# Patient Record
Sex: MALE | Race: WHITE | Employment: FULL TIME | URBAN - METROPOLITAN AREA
[De-identification: names, ages, dates, MRNs, and addresses within clinical notes are randomized per-mention and may not be internally consistent; named-entity substitution may affect disease eponyms.]

---

## 2017-02-17 ENCOUNTER — OFFICE VISIT (OUTPATIENT)
Dept: FAMILY MEDICINE CLINIC | Age: 52
End: 2017-02-17

## 2017-02-17 VITALS
WEIGHT: 234.2 LBS | HEART RATE: 70 BPM | DIASTOLIC BLOOD PRESSURE: 90 MMHG | BODY MASS INDEX: 32.21 KG/M2 | OXYGEN SATURATION: 97 % | SYSTOLIC BLOOD PRESSURE: 138 MMHG

## 2017-02-17 DIAGNOSIS — G89.29 CHRONIC RIGHT-SIDED LOW BACK PAIN WITHOUT SCIATICA: ICD-10-CM

## 2017-02-17 DIAGNOSIS — M54.50 CHRONIC RIGHT-SIDED LOW BACK PAIN WITHOUT SCIATICA: ICD-10-CM

## 2017-02-17 DIAGNOSIS — N50.811 TESTICULAR PAIN, RIGHT: Primary | ICD-10-CM

## 2017-02-17 LAB
BILIRUBIN, POC: 0
BLOOD URINE, POC: 0
CLARITY, POC: CLEAR
COLOR, POC: YELLOW
GLUCOSE URINE, POC: 0
KETONES, POC: 0
LEUKOCYTE EST, POC: 0
NITRITE, POC: 0
PH, POC: 5
PROSTATE SPECIFIC ANTIGEN: 1.85 NG/ML (ref 0–4)
PROTEIN, POC: 0
SPECIFIC GRAVITY, POC: 1.02
UROBILINOGEN, POC: 0.2

## 2017-02-17 PROCEDURE — 81002 URINALYSIS NONAUTO W/O SCOPE: CPT | Performed by: PHYSICIAN ASSISTANT

## 2017-02-17 PROCEDURE — 99213 OFFICE O/P EST LOW 20 MIN: CPT | Performed by: PHYSICIAN ASSISTANT

## 2017-02-17 PROCEDURE — 36415 COLL VENOUS BLD VENIPUNCTURE: CPT | Performed by: PHYSICIAN ASSISTANT

## 2017-02-17 RX ORDER — NABUMETONE 750 MG/1
750 TABLET, FILM COATED ORAL 2 TIMES DAILY
Qty: 60 TABLET | Refills: 0 | Status: SHIPPED | OUTPATIENT
Start: 2017-02-17 | End: 2020-01-07

## 2017-02-17 ASSESSMENT — ENCOUNTER SYMPTOMS: RESPIRATORY NEGATIVE: 1

## 2017-02-24 ENCOUNTER — TELEPHONE (OUTPATIENT)
Dept: FAMILY MEDICINE CLINIC | Age: 52
End: 2017-02-24

## 2018-11-15 ENCOUNTER — OFFICE VISIT (OUTPATIENT)
Dept: FAMILY MEDICINE CLINIC | Age: 53
End: 2018-11-15
Payer: COMMERCIAL

## 2018-11-15 VITALS
DIASTOLIC BLOOD PRESSURE: 88 MMHG | WEIGHT: 246.2 LBS | HEART RATE: 70 BPM | HEIGHT: 71 IN | TEMPERATURE: 97.9 F | BODY MASS INDEX: 34.47 KG/M2 | OXYGEN SATURATION: 97 % | SYSTOLIC BLOOD PRESSURE: 138 MMHG

## 2018-11-15 DIAGNOSIS — R21 RASH OF GROIN: ICD-10-CM

## 2018-11-15 DIAGNOSIS — B35.9 TINEA: Primary | ICD-10-CM

## 2018-11-15 DIAGNOSIS — L28.2 PRURITIC RASH: ICD-10-CM

## 2018-11-15 PROCEDURE — 99213 OFFICE O/P EST LOW 20 MIN: CPT | Performed by: PHYSICIAN ASSISTANT

## 2018-11-15 RX ORDER — CLOTRIMAZOLE 1 %
CREAM (GRAM) TOPICAL
Qty: 60 G | Refills: 1 | Status: SHIPPED | OUTPATIENT
Start: 2018-11-15 | End: 2020-01-07

## 2018-11-15 RX ORDER — MELOXICAM 15 MG/1
1 TABLET ORAL
Refills: 4 | COMMUNITY
Start: 2018-10-25 | End: 2020-01-07

## 2018-11-15 ASSESSMENT — PATIENT HEALTH QUESTIONNAIRE - PHQ9
2. FEELING DOWN, DEPRESSED OR HOPELESS: 0
SUM OF ALL RESPONSES TO PHQ QUESTIONS 1-9: 0
SUM OF ALL RESPONSES TO PHQ QUESTIONS 1-9: 0
1. LITTLE INTEREST OR PLEASURE IN DOING THINGS: 0
SUM OF ALL RESPONSES TO PHQ9 QUESTIONS 1 & 2: 0

## 2018-11-15 NOTE — PROGRESS NOTES
Desonex, antifungal, right inner thigh. . Years. Resolved and returned. Itches only to right inner thigh. X 3 mo. [address]      Chief Complaint   Patient presents with    Rash     Rash on leg for 2-3 months         Subjective: Meredith Zambrano is a 48 y.o.  male           Review of Systems: Remaining reviewed and are otherwise unremarkable for other neurologic, constitutional, ENT, cardiac, pulmonary, GI, , or  musculoskeletal complaints. Medications/ Allergies: Reviewed and in chart. Objective:   Physical Exam   Constitutional: He appears well-developed and well-nourished. Skin: Skin is warm and dry. Rash noted. Well circumscribed scaly erythemic warm excoriated rash to groin . Vitals reviewed. Assessment and Plan      1. Tinea    2. Pruritic rash    3. Rash of groin          Patient Instructions   Patient Education- jock itch. Orders Placed This Encounter   Medications    clotrimazole (LOTRIMIN) 1 % cream     Sig: Apply topically 2 times daily for one week AFTER the rash has disappeared.      Dispense:  60 g     Refill:  1

## 2020-01-07 ENCOUNTER — OFFICE VISIT (OUTPATIENT)
Dept: FAMILY MEDICINE CLINIC | Age: 55
End: 2020-01-07
Payer: COMMERCIAL

## 2020-01-07 VITALS
OXYGEN SATURATION: 97 % | HEIGHT: 71 IN | WEIGHT: 251 LBS | BODY MASS INDEX: 35.14 KG/M2 | HEART RATE: 92 BPM | SYSTOLIC BLOOD PRESSURE: 138 MMHG | DIASTOLIC BLOOD PRESSURE: 92 MMHG

## 2020-01-07 PROCEDURE — 99213 OFFICE O/P EST LOW 20 MIN: CPT | Performed by: PHYSICIAN ASSISTANT

## 2020-01-07 RX ORDER — LEVOFLOXACIN 750 MG/1
750 TABLET ORAL DAILY
COMMUNITY
End: 2020-03-02

## 2020-01-07 RX ORDER — LISINOPRIL 5 MG/1
5 TABLET ORAL DAILY
Qty: 30 TABLET | Refills: 1 | Status: SHIPPED | OUTPATIENT
Start: 2020-01-07 | End: 2020-01-10 | Stop reason: SINTOL

## 2020-01-07 RX ORDER — MECLIZINE HYDROCHLORIDE 25 MG/1
25 TABLET ORAL 3 TIMES DAILY PRN
Qty: 30 TABLET | Refills: 0 | Status: SHIPPED | OUTPATIENT
Start: 2020-01-07 | End: 2020-01-17

## 2020-01-07 ASSESSMENT — PATIENT HEALTH QUESTIONNAIRE - PHQ9
SUM OF ALL RESPONSES TO PHQ QUESTIONS 1-9: 0
SUM OF ALL RESPONSES TO PHQ QUESTIONS 1-9: 0
1. LITTLE INTEREST OR PLEASURE IN DOING THINGS: 0
2. FEELING DOWN, DEPRESSED OR HOPELESS: 0
SUM OF ALL RESPONSES TO PHQ9 QUESTIONS 1 & 2: 0

## 2020-01-07 ASSESSMENT — ENCOUNTER SYMPTOMS: RESPIRATORY NEGATIVE: 1

## 2020-01-07 NOTE — PROGRESS NOTES
Subjective:      Patient ID: Arely Aguirre is a 47 y.o. male. HPI  Patient had cold symptoms on 12/26, worsened and went to urgent care and was started on levaquin for sinus and ear infection, has 2 days left of that. While at urgent care BP was noted to be elevated. Has had left ear pressure and vertigo with nausea for the last couple of day. Review of Systems   Constitutional: Positive for appetite change. Negative for fever. HENT: Positive for ear pain. Left ear pressure   Respiratory: Negative. Cardiovascular: Negative. Neurological: Positive for dizziness. Objective:   Physical Exam  Constitutional:       Appearance: Normal appearance. HENT:      Right Ear: Tympanic membrane normal.      Left Ear: Tympanic membrane normal.      Nose: Nose normal.      Mouth/Throat:      Mouth: Mucous membranes are moist.   Cardiovascular:      Rate and Rhythm: Normal rate and regular rhythm. Pulses: Normal pulses. Heart sounds: Normal heart sounds. Neurological:      Mental Status: He is alert. Assessment:       Diagnosis Orders   1. Essential hypertension     2. Vertigo             Plan: Will start lisinopril with follow up in 3-4 weeks for recheck. Discussed diet, exercise, weight loss and sodium restriction. Meclizine and flonase for vertigo. Patient is to call if no improvement or signs and symptoms worsen.           YINKA Blandon

## 2020-01-10 ENCOUNTER — TELEPHONE (OUTPATIENT)
Dept: FAMILY MEDICINE CLINIC | Age: 55
End: 2020-01-10

## 2020-01-10 RX ORDER — AMLODIPINE BESYLATE 5 MG/1
5 TABLET ORAL DAILY
Qty: 30 TABLET | Refills: 0 | Status: SHIPPED | OUTPATIENT
Start: 2020-01-10 | End: 2020-01-28 | Stop reason: SDUPTHER

## 2020-01-17 NOTE — TELEPHONE ENCOUNTER
Pt doesn't have any sob, fevers, chest tightness or chest congestion. He states he has drainage that is making him cough. I offered an appt. He declined. He will try otc allergy medication.

## 2020-01-28 ENCOUNTER — OFFICE VISIT (OUTPATIENT)
Dept: FAMILY MEDICINE CLINIC | Age: 55
End: 2020-01-28
Payer: COMMERCIAL

## 2020-01-28 VITALS
SYSTOLIC BLOOD PRESSURE: 140 MMHG | WEIGHT: 253.6 LBS | OXYGEN SATURATION: 98 % | HEIGHT: 71 IN | HEART RATE: 75 BPM | BODY MASS INDEX: 35.5 KG/M2 | DIASTOLIC BLOOD PRESSURE: 92 MMHG

## 2020-01-28 PROCEDURE — 99213 OFFICE O/P EST LOW 20 MIN: CPT | Performed by: PHYSICIAN ASSISTANT

## 2020-01-28 RX ORDER — BENZONATATE 100 MG/1
100 CAPSULE ORAL 3 TIMES DAILY PRN
Qty: 21 CAPSULE | Refills: 0 | Status: SHIPPED | OUTPATIENT
Start: 2020-01-28 | End: 2020-02-04

## 2020-01-28 RX ORDER — AMLODIPINE BESYLATE 5 MG/1
5 TABLET ORAL DAILY
Qty: 30 TABLET | Refills: 1 | Status: SHIPPED | OUTPATIENT
Start: 2020-01-28 | End: 2020-03-02 | Stop reason: ALTCHOICE

## 2020-03-02 ENCOUNTER — OFFICE VISIT (OUTPATIENT)
Dept: FAMILY MEDICINE CLINIC | Age: 55
End: 2020-03-02
Payer: COMMERCIAL

## 2020-03-02 VITALS
DIASTOLIC BLOOD PRESSURE: 84 MMHG | HEART RATE: 78 BPM | SYSTOLIC BLOOD PRESSURE: 142 MMHG | OXYGEN SATURATION: 99 % | BODY MASS INDEX: 35.7 KG/M2 | HEIGHT: 71 IN | WEIGHT: 255 LBS

## 2020-03-02 PROCEDURE — 99213 OFFICE O/P EST LOW 20 MIN: CPT | Performed by: PHYSICIAN ASSISTANT

## 2020-03-02 RX ORDER — AMLODIPINE BESYLATE 10 MG/1
10 TABLET ORAL DAILY
Qty: 30 TABLET | Refills: 0 | Status: SHIPPED | OUTPATIENT
Start: 2020-03-02 | End: 2020-03-03 | Stop reason: SDUPTHER

## 2020-03-03 RX ORDER — AMLODIPINE BESYLATE 10 MG/1
10 TABLET ORAL DAILY
Qty: 30 TABLET | Refills: 0 | Status: SHIPPED | OUTPATIENT
Start: 2020-03-03 | End: 2020-03-27 | Stop reason: SDUPTHER

## 2020-03-03 NOTE — TELEPHONE ENCOUNTER
Pt called and states that the [facility], he needs to it sent to [facility] in [place].     [facility] taken out of pt's chart per his request

## 2020-03-27 RX ORDER — AMLODIPINE BESYLATE 10 MG/1
10 TABLET ORAL DAILY
Qty: 30 TABLET | Refills: 1 | Status: SHIPPED | OUTPATIENT
Start: 2020-03-27 | End: 2020-06-02 | Stop reason: SDUPTHER

## 2020-03-27 NOTE — TELEPHONE ENCOUNTER
Rodríguez Sylvester [phone] (home)    is requesting refill(s) of medication Amlodipine  to preferred pharmacy [facility] 3/2/20 (pertaining to medication)   Last refill 3/3/20 (per medication requested)  Next office visit scheduled or attempted Yes  Date 5/5/20  If No, reason

## 2020-06-02 RX ORDER — AMLODIPINE BESYLATE 10 MG/1
10 TABLET ORAL DAILY
Qty: 30 TABLET | Refills: 1 | Status: SHIPPED | OUTPATIENT
Start: 2020-06-02 | End: 2020-08-03

## 2020-06-02 NOTE — TELEPHONE ENCOUNTER
Iris Morelia [phone] (home)    is requesting refill(s) of medication amLODIPine (NORVASC) 10 MG tablet      to preferred pharmacy [facility] [address] [facility]    Last OV 3/02/20 (pertaining to medication)   Last refill 3/27/20 (per medication requested)  Next office visit scheduled or attempted Yes  Date 6/8/20  If No, reason

## 2020-06-08 ENCOUNTER — OFFICE VISIT (OUTPATIENT)
Dept: FAMILY MEDICINE CLINIC | Age: 55
End: 2020-06-08
Payer: COMMERCIAL

## 2020-06-08 VITALS
BODY MASS INDEX: 36.48 KG/M2 | DIASTOLIC BLOOD PRESSURE: 80 MMHG | WEIGHT: 260.6 LBS | TEMPERATURE: 98.9 F | HEIGHT: 71 IN | OXYGEN SATURATION: 98 % | SYSTOLIC BLOOD PRESSURE: 140 MMHG | HEART RATE: 89 BPM

## 2020-06-08 PROCEDURE — 99213 OFFICE O/P EST LOW 20 MIN: CPT | Performed by: PHYSICIAN ASSISTANT

## 2020-06-08 NOTE — PROGRESS NOTES
Subjective: Jennifer Hutchins is a 54 y.o. male with hypertension. Current Outpatient Medications   Medication Sig Dispense Refill    amLODIPine (NORVASC) 10 MG tablet Take 1 tablet by mouth daily 30 tablet 1     No current facility-administered medications for this visit. Hypertension ROS: taking medications as instructed, no medication side effects noted, no TIA's, no chest pain on exertion, notes new dyspnea on exertion, no swelling of ankles. New concerns: feeling more SOB than normal, easily winded. Face feels flushed often, reflux symptoms or burning in the throat and eyes are irritated. Objective:   BP (!) 140/80 (Site: Left Upper Arm, Position: Sitting)   Pulse 89   Temp 98.9 °F (37.2 °C)   Ht 5' 11\" (1.803 m)   Wt 260 lb 9.6 oz (118.2 kg)   SpO2 98%   BMI 36.35 kg/m²    Appearance alert, well appearing, and in no distress, oriented to person, place, and time and overweight. General exam BP noted to be well controlled today in office, S1, S2 normal, no gallop, no murmur, chest clear, no JVD, no HSM, no edema. Assessment:     Diagnosis Orders   1. Essential hypertension  Kathy Antonio MD, Cardiology, [facility]   2. BURGOS (dyspnea on exertion)  Kathy Antonio MD, Cardiology, [facility]         Plan: Will refer to cardiology for evaluation. He is to schedule with eye doctor. Trial of nexium for reflux like symptoms.

## 2020-06-18 NOTE — PROGRESS NOTES
disorientation or hallucinations  Ophthalmic ROS: negative for - dry eyes, eye pain or loss of vision  ENT ROS: negative for - nasal discharge or sore throat  Allergy and Immunology ROS: negative for - hives or itchy/watery eyes  Hematological and Lymphatic ROS: negative for - jaundice or night sweats  Endocrine ROS: negative for - mood swings or temperature intolerance  Breast ROS: deferred  Respiratory ROS: negative for - hemoptysis or stridor  Gastrointestinal ROS: no abdominal pain, change in bowel habits, or black or bloody stools  Genito-Urinary ROS: no dysuria, trouble voiding, or hematuria  Musculoskeletal ROS: negative for - gait disturbance, joint pain or joint stiffness  Neurological ROS: negative for - seizures or speech problems  Dermatological ROS: negative for - rash or skin lesion changes      Physical Examination:    There were no vitals taken for this visit. There were no vitals taken for this visit.          Wt Readings from Last 3 Encounters:   06/08/20 260 lb 9.6 oz (118.2 kg)   03/02/20 255 lb (115.7 kg)   01/28/20 253 lb 9.6 oz (115 kg)     No intake or output data in the 24 hours ending 06/18/20 1128    General Appearance:  Alert, cooperative, no distress, appears stated age   Head:  Normocephalic, without obvious abnormality, atraumatic   Eyes:  PERRL, conjunctiva/corneas clear       Nose: Nares normal, no drainage or sinus tenderness   Throat: Lips, mucosa, and tongue normal   Neck: Supple, symmetrical, trachea midline, no adenopathy, thyroid: not enlarged, symmetric, no tenderness/mass/nodules, no carotid bruit or JVD       Lungs:   Clear to auscultation bilaterally, respirations unlabored   Chest Wall:  No tenderness or deformity   Heart:  Regular rhythm and normal rate; S1, S2 are normal; no murmur noted; no rub or gallop   Abdomen:   Soft, non-tender, bowel sounds active all four quadrants,  no masses, no organomegaly           Extremities: Extremities normal, atraumatic, no cyanosis or

## 2020-06-19 ENCOUNTER — OFFICE VISIT (OUTPATIENT)
Dept: CARDIOLOGY CLINIC | Age: 55
End: 2020-06-19
Payer: COMMERCIAL

## 2020-06-19 VITALS
OXYGEN SATURATION: 97 % | WEIGHT: 263 LBS | HEIGHT: 71 IN | DIASTOLIC BLOOD PRESSURE: 94 MMHG | BODY MASS INDEX: 36.82 KG/M2 | SYSTOLIC BLOOD PRESSURE: 144 MMHG | HEART RATE: 87 BPM

## 2020-06-19 PROBLEM — I10 ESSENTIAL HYPERTENSION: Status: ACTIVE | Noted: 2020-06-19

## 2020-06-19 PROBLEM — R06.02 SOB (SHORTNESS OF BREATH): Status: ACTIVE | Noted: 2020-06-19

## 2020-06-19 PROBLEM — R07.89 OTHER CHEST PAIN: Status: ACTIVE | Noted: 2020-06-19

## 2020-06-19 PROCEDURE — 99244 OFF/OP CNSLTJ NEW/EST MOD 40: CPT | Performed by: INTERNAL MEDICINE

## 2020-06-19 PROCEDURE — 93000 ELECTROCARDIOGRAM COMPLETE: CPT | Performed by: INTERNAL MEDICINE

## 2020-06-19 NOTE — PROGRESS NOTES
[address] [facility]   Cardiovascular Evaluation    PATIENT: Sandoval Real  DATE: 2020  MRN: <[id number]>  CSN: [id number]  : 1965    Primary Care Doctor/Referring provider: YINKA Pena    Reason for evaluation/Chief complaint:   New Patient; Chest Pain; Edema; and Shortness of Breath      Subjective:    History of present illness on initial date of evaluation:   Sandoval Real is a 54 y.o. patient who presents as a new patient for evaluation of chest pain and shortness of breath. He has a past medical history of hypertension. Today he reports he has been having chest pain off and on over the past year. He states sometimes the pain is sharp and other times it is dull. He reports over the past 3 months he has experienced shortness of breath with heavy exertion or walking any distance. He reports recent hand edema. He is not aware of any heart disease within his family. Patient Active Problem List   Diagnosis    Family history of colon cancer    Internal derangement of right knee    Patellar dislocation    Chondromalacia of right knee    Other chest pain    SOB (shortness of breath)    Essential hypertension         Cardiac Testing: I have reviewed the findings below. EKG:  ECHO:   STRESS TEST:  CATH:  BYPASS:  VASCULAR:    Past Medical History:   has a past medical history of Family history of colon cancer. Surgical History:   has a past surgical history that includes [place] tooth extraction. Social History:   reports that he has never smoked. He has never used smokeless tobacco. He reports that he does not drink alcohol or use drugs. Family History:  No evidence for sudden cardiac death or premature CAD    Medications:  Reviewed and are listed in nursing record. and/or listed below  Outpatient Medications:  Prior to Admission medications    Medication Sig Start Date End Date Taking?  Authorizing Provider   amLODIPine (NORVASC) 10 MG tablet Take 1 Echocardiogram to view size and strength of the heart   4. Stress Myoview test to risk stratify   5. CT calcium score to risk stratify  6. We will call you with the results   7. Follow up will be after testing to access further treatment     All questions and concerns were addressed to the patient/family. Alternatives to my treatment were discussed. This note was scribed in the presence of Dr.Puri SORIANO by Wilder Rosado RN.     I, Dr. Mariah Gamez, personally performed the services described in this documentation, as scribed by the above signed scribe in my presence. It is both accurate and complete to my knowledge. I agree with the details independently gathered by the clinical support staff, while the remaining scribed note accurately describes my personal service to the patient.         Mariah Gamez MD, [address], [facility], [address] [facility] office  [phone] [facility]  6/19/2020  9:02 AM

## 2020-06-30 ENCOUNTER — HOSPITAL ENCOUNTER (OUTPATIENT)
Dept: NUCLEAR MEDICINE | Age: 55
Discharge: HOME | End: 2020-06-30
Payer: COMMERCIAL

## 2020-06-30 ENCOUNTER — APPOINTMENT (OUTPATIENT)
Dept: NON INVASIVE DIAGNOSTICS | Age: 55
End: 2020-06-30
Payer: COMMERCIAL

## 2020-06-30 ENCOUNTER — HOSPITAL ENCOUNTER (OUTPATIENT)
Dept: NON INVASIVE DIAGNOSTICS | Age: 55
Discharge: HOME | End: 2020-06-30
Payer: COMMERCIAL

## 2020-06-30 ENCOUNTER — HOSPITAL ENCOUNTER (OUTPATIENT)
Dept: CT IMAGING | Age: 55
Discharge: HOME | End: 2020-06-30
Payer: COMMERCIAL

## 2020-06-30 ENCOUNTER — TELEPHONE (OUTPATIENT)
Dept: CARDIOLOGY CLINIC | Age: 55
End: 2020-06-30

## 2020-06-30 LAB
LV EF: 43 %
LV EF: 54 %
LVEF MODALITY: NORMAL
LVEF MODALITY: NORMAL

## 2020-06-30 PROCEDURE — 93017 CV STRESS TEST TRACING ONLY: CPT

## 2020-06-30 PROCEDURE — 93306 TTE W/DOPPLER COMPLETE: CPT

## 2020-06-30 PROCEDURE — 75571 CT HRT W/O DYE W/CA TEST: CPT

## 2020-06-30 PROCEDURE — A9502 TC99M TETROFOSMIN: HCPCS | Performed by: INTERNAL MEDICINE

## 2020-06-30 PROCEDURE — 78452 HT MUSCLE IMAGE SPECT MULT: CPT

## 2020-06-30 PROCEDURE — 3430000000 HC RX DIAGNOSTIC RADIOPHARMACEUTICAL: Performed by: INTERNAL MEDICINE

## 2020-06-30 RX ORDER — ATORVASTATIN CALCIUM 10 MG/1
10 TABLET, FILM COATED ORAL DAILY
Qty: 90 TABLET | Refills: 3 | Status: SHIPPED | OUTPATIENT
Start: 2020-06-30 | End: 2021-06-24

## 2020-06-30 RX ORDER — ASPIRIN 81 MG/1
81 TABLET ORAL DAILY
Qty: 90 TABLET | Refills: 3 | Status: SHIPPED | OUTPATIENT
Start: 2020-06-30 | End: 2021-07-20

## 2020-06-30 RX ADMIN — TETROFOSMIN 32.2 MILLICURIE: 1.38 INJECTION, POWDER, LYOPHILIZED, FOR SOLUTION INTRAVENOUS at 09:08

## 2020-06-30 RX ADMIN — TETROFOSMIN 11.1 MILLICURIE: 1.38 INJECTION, POWDER, LYOPHILIZED, FOR SOLUTION INTRAVENOUS at 07:49

## 2020-07-01 ENCOUNTER — TELEPHONE (OUTPATIENT)
Dept: CARDIOLOGY CLINIC | Age: 55
End: 2020-07-01

## 2020-07-01 NOTE — TELEPHONE ENCOUNTER
----- Message from Jacquie Beard MD sent at 6/30/2020  3:59 PM EDT -----  The test shows that the patient's overall heart strength may be mildly reduced. This is different than the myocardial perfusion scan which demonstrates normal heart strength. I recommend that the patient undergo cardiac MRI for further confirmation.

## 2020-07-03 ENCOUNTER — HOSPITAL ENCOUNTER (OUTPATIENT)
Age: 55
Discharge: HOME | End: 2020-07-03
Payer: COMMERCIAL

## 2020-07-03 LAB
ALBUMIN SERPL-MCNC: 3.8 G/DL (ref 3.4–5)
ALP BLD-CCNC: 96 U/L (ref 40–129)
ALT SERPL-CCNC: 21 U/L (ref 10–40)
AST SERPL-CCNC: 14 U/L (ref 15–37)
BILIRUB SERPL-MCNC: 0.4 MG/DL (ref 0–1)
BILIRUBIN DIRECT: <0.2 MG/DL (ref 0–0.3)
BILIRUBIN, INDIRECT: ABNORMAL MG/DL (ref 0–1)
CHOLESTEROL, TOTAL: 156 MG/DL (ref 0–199)
HDLC SERPL-MCNC: 42 MG/DL (ref 40–60)
LDL CHOLESTEROL CALCULATED: 96 MG/DL
TOTAL PROTEIN: 6.6 G/DL (ref 6.4–8.2)
TRIGL SERPL-MCNC: 88 MG/DL (ref 0–150)
VLDLC SERPL CALC-MCNC: 18 MG/DL

## 2020-07-03 PROCEDURE — 80076 HEPATIC FUNCTION PANEL: CPT

## 2020-07-03 PROCEDURE — 36415 COLL VENOUS BLD VENIPUNCTURE: CPT

## 2020-07-03 PROCEDURE — 80061 LIPID PANEL: CPT

## 2020-07-08 ENCOUNTER — TELEPHONE (OUTPATIENT)
Dept: CARDIOLOGY CLINIC | Age: 55
End: 2020-07-08

## 2020-07-08 NOTE — TELEPHONE ENCOUNTER
Spoke with pt. refaxed MRI order to . Also gave pt the number to their central scheduling so he can call to set up.

## 2020-07-14 ENCOUNTER — TELEPHONE (OUTPATIENT)
Dept: CARDIOLOGY CLINIC | Age: 55
End: 2020-07-14

## 2020-07-14 NOTE — TELEPHONE ENCOUNTER
Becca Steel needs pt's clinical reports faxed to her at fax#:  [phone], they received pt's order for his MRI, but no authorization through his insurance.

## 2020-07-20 LAB
LV EF: 54 %
LVEF MODALITY: NORMAL

## 2020-07-22 ENCOUNTER — TELEPHONE (OUTPATIENT)
Dept: CARDIOLOGY CLINIC | Age: 55
End: 2020-07-22

## 2020-07-22 NOTE — TELEPHONE ENCOUNTER
----- Message from Yahaira Morel MD sent at 7/22/2020  1:27 PM EDT -----  The test is normal. Please call the patient and inform them of the normal result. Please inform the patient that there MRI, which is the gold standard, shows normal heart strength, size, and structural characteristics. The patient should continue aspirin 81 and Lipitor as previously recommended. They should follow-up with us in a few months and their primary care physician routinely for monitoring.

## 2020-07-22 NOTE — TELEPHONE ENCOUNTER
Created telephone encounter. Spoke with Bill relayed message per VSP regarding MRI. Pt verbalized understanding, he will call at a later date to schedule follow up.

## 2020-07-22 NOTE — TELEPHONE ENCOUNTER
----- Message from Darren Landau, MD sent at 7/22/2020  1:27 PM EDT -----  The test is normal. Please call the patient and inform them of the normal result. Please inform the patient that there MRI, which is the gold standard, shows normal heart strength, size, and structural characteristics. The patient should continue aspirin 81 and Lipitor as previously recommended. They should follow-up with us in a few months and their primary care physician routinely for monitoring.

## 2020-08-10 ENCOUNTER — TELEPHONE (OUTPATIENT)
Dept: FAMILY MEDICINE CLINIC | Age: 55
End: 2020-08-10

## 2020-08-10 ENCOUNTER — OFFICE VISIT (OUTPATIENT)
Dept: FAMILY MEDICINE CLINIC | Age: 55
End: 2020-08-10
Payer: COMMERCIAL

## 2020-08-10 VITALS
BODY MASS INDEX: 36.51 KG/M2 | OXYGEN SATURATION: 98 % | HEIGHT: 71 IN | SYSTOLIC BLOOD PRESSURE: 128 MMHG | DIASTOLIC BLOOD PRESSURE: 86 MMHG | WEIGHT: 260.8 LBS | HEART RATE: 85 BPM | TEMPERATURE: 98.4 F

## 2020-08-10 PROCEDURE — 99213 OFFICE O/P EST LOW 20 MIN: CPT | Performed by: PHYSICIAN ASSISTANT

## 2020-08-10 ASSESSMENT — ENCOUNTER SYMPTOMS
SHORTNESS OF BREATH: 1
COUGH: 1
CHEST TIGHTNESS: 1

## 2020-08-10 NOTE — TELEPHONE ENCOUNTER
I was not aware that they were only doing VV. They are ordering pulmonary function tests etc virtually so that is an option as that is what they will need to check into things. The other option would be to try another pulmonology group through [facility] or [facility].

## 2020-08-10 NOTE — PROGRESS NOTES
Subjective:      Patient ID: Mary Duke is a 54 y.o. male. HPI  Patient presents with continued SOB and cough. This has been an ongoing problem has seen cardiology and has had a full work up which has all been negative. The SOB is present most of the time even at rest but worse with exertion/humigity. He was a  for 30 years. Occasional cigar use but never a chronic smoker. The cough is dry. O2 sats at home mostly 94 range. When he is feeling very winded can get down to mid 80's. Other concern is a flushing sensation. Feels very warm at times like he has a temp but is always afebrile. He is on norvasc. Review of Systems   Constitutional: Negative. HENT: Negative. Respiratory: Positive for cough, chest tightness and shortness of breath. Cardiovascular: Positive for chest pain. Negative for leg swelling. Skin:        Flushing sensation       Objective:   Physical Exam  Constitutional:       Appearance: Normal appearance. He is obese. Cardiovascular:      Rate and Rhythm: Normal rate and regular rhythm. Pulses: Normal pulses. Heart sounds: Normal heart sounds. Pulmonary:      Effort: Pulmonary effort is normal.      Breath sounds: Normal breath sounds. Skin:     General: Skin is warm and dry. Neurological:      Mental Status: He is alert and oriented to person, place, and time. Assessment:       Diagnosis Orders   1. Chronic dyspnea  Felicita Parrish MD, Pulmonary, [facility]   2. Chronic cough  Elaina Mendoza MD, Pulmonary, [facility]           Plan: Will refer to pulmonology for eval.   Stop norvasc to see if flushing resolves. To call on Friday to update and will start new BP medication at that time.          YINKA Allen

## 2020-08-10 NOTE — TELEPHONE ENCOUNTER
Pt states he wants to go to [facility]. He doesn't want to do a vv or isn't able to do a vv. He doesn't feel comfortable with a vv for that.  He is wanting to know what [facility] pulmonary doctor you suggest

## 2020-08-17 ENCOUNTER — TELEPHONE (OUTPATIENT)
Dept: FAMILY MEDICINE CLINIC | Age: 55
End: 2020-08-17

## 2020-08-17 NOTE — TELEPHONE ENCOUNTER
Pt called and states that, per Ruchi's instruction, he stopped his bp medication on 8/10/20, because of the symptoms he was having. He states that the symptoms did not go away. (eyes burning, flushed skin). He states that he did restart his bp med, as his bp was high all weekend. He is wanting to know what Celine Olivier would advise.  Please advise pt [phone]

## 2020-08-17 NOTE — TELEPHONE ENCOUNTER
Im not really clear where the flushing sensation is coming from if not from the medication. None of the others should cause it.

## 2021-02-01 RX ORDER — AMLODIPINE BESYLATE 10 MG/1
TABLET ORAL
Qty: 90 TABLET | Refills: 0 | Status: SHIPPED | OUTPATIENT
Start: 2021-02-01 | End: 2021-05-03

## 2021-03-23 ENCOUNTER — OFFICE VISIT (OUTPATIENT)
Dept: FAMILY MEDICINE CLINIC | Age: 56
End: 2021-03-23
Payer: COMMERCIAL

## 2021-03-23 VITALS
WEIGHT: 248.4 LBS | SYSTOLIC BLOOD PRESSURE: 132 MMHG | TEMPERATURE: 97.7 F | OXYGEN SATURATION: 98 % | HEIGHT: 71 IN | DIASTOLIC BLOOD PRESSURE: 84 MMHG | HEART RATE: 73 BPM | BODY MASS INDEX: 34.77 KG/M2

## 2021-03-23 DIAGNOSIS — Z11.59 NEED FOR HEPATITIS C SCREENING TEST: ICD-10-CM

## 2021-03-23 DIAGNOSIS — E78.5 HYPERLIPIDEMIA, UNSPECIFIED HYPERLIPIDEMIA TYPE: ICD-10-CM

## 2021-03-23 DIAGNOSIS — I10 ESSENTIAL HYPERTENSION: Primary | ICD-10-CM

## 2021-03-23 PROBLEM — R07.89 OTHER CHEST PAIN: Status: RESOLVED | Noted: 2020-06-19 | Resolved: 2021-03-23

## 2021-03-23 PROBLEM — R06.02 SOB (SHORTNESS OF BREATH): Status: RESOLVED | Noted: 2020-06-19 | Resolved: 2021-03-23

## 2021-03-23 LAB
A/G RATIO: 1.7 (ref 1.1–2.2)
ALBUMIN SERPL-MCNC: 4 G/DL (ref 3.4–5)
ALP BLD-CCNC: 133 U/L (ref 40–129)
ALT SERPL-CCNC: 22 U/L (ref 10–40)
ANION GAP SERPL CALCULATED.3IONS-SCNC: 7 MMOL/L (ref 3–16)
AST SERPL-CCNC: 15 U/L (ref 15–37)
BILIRUB SERPL-MCNC: 0.6 MG/DL (ref 0–1)
BUN BLDV-MCNC: 8 MG/DL (ref 7–20)
CALCIUM SERPL-MCNC: 9 MG/DL (ref 8.3–10.6)
CHLORIDE BLD-SCNC: 107 MMOL/L (ref 99–110)
CHOLESTEROL, TOTAL: 121 MG/DL (ref 0–199)
CO2: 28 MMOL/L (ref 21–32)
CREAT SERPL-MCNC: 1 MG/DL (ref 0.9–1.3)
GFR AFRICAN AMERICAN: >60
GFR NON-AFRICAN AMERICAN: >60
GLOBULIN: 2.3 G/DL
GLUCOSE BLD-MCNC: 96 MG/DL (ref 70–99)
HDLC SERPL-MCNC: 43 MG/DL (ref 40–60)
HEPATITIS C ANTIBODY INTERPRETATION: NORMAL
LDL CHOLESTEROL CALCULATED: 65 MG/DL
POTASSIUM SERPL-SCNC: 4.6 MMOL/L (ref 3.5–5.1)
SODIUM BLD-SCNC: 142 MMOL/L (ref 136–145)
TOTAL PROTEIN: 6.3 G/DL (ref 6.4–8.2)
TRIGL SERPL-MCNC: 63 MG/DL (ref 0–150)
VLDLC SERPL CALC-MCNC: 13 MG/DL

## 2021-03-23 PROCEDURE — 99213 OFFICE O/P EST LOW 20 MIN: CPT | Performed by: PHYSICIAN ASSISTANT

## 2021-03-23 SDOH — ECONOMIC STABILITY: FOOD INSECURITY: WITHIN THE PAST 12 MONTHS, THE FOOD YOU BOUGHT JUST DIDN'T LAST AND YOU DIDN'T HAVE MONEY TO GET MORE.: NEVER TRUE

## 2021-03-23 SDOH — ECONOMIC STABILITY: FOOD INSECURITY: WITHIN THE PAST 12 MONTHS, YOU WORRIED THAT YOUR FOOD WOULD RUN OUT BEFORE YOU GOT MONEY TO BUY MORE.: NEVER TRUE

## 2021-03-23 SDOH — ECONOMIC STABILITY: INCOME INSECURITY: HOW HARD IS IT FOR YOU TO PAY FOR THE VERY BASICS LIKE FOOD, HOUSING, MEDICAL CARE, AND HEATING?: NOT HARD AT ALL

## 2021-03-23 SDOH — ECONOMIC STABILITY: TRANSPORTATION INSECURITY
IN THE PAST 12 MONTHS, HAS LACK OF TRANSPORTATION KEPT YOU FROM MEETINGS, WORK, OR FROM GETTING THINGS NEEDED FOR DAILY LIVING?: NOT ASKED

## 2021-03-23 ASSESSMENT — PATIENT HEALTH QUESTIONNAIRE - PHQ9
SUM OF ALL RESPONSES TO PHQ QUESTIONS 1-9: 0
SUM OF ALL RESPONSES TO PHQ9 QUESTIONS 1 & 2: 0

## 2021-03-23 NOTE — PROGRESS NOTES
SUBJECTIVE:  Sonja Robins is a 54 y.o. male who presents for evaluation of hypertension and hyperlipidemia. He indicates that he is feeling well and denies any symptoms referable to his elevated blood pressure. Specifically denies chest pain, palpitations, dyspnea, orthopnea, PND or peripheral edema. No anorexia, arthralgia, or leg cramps noted. Current medication regimen is as listed below. He denies any side effects of medication, and has been taking it regularly. Current Outpatient Medications   Medication Sig Dispense Refill    amLODIPine (NORVASC) 10 MG tablet TAKE ONE TABLET BY MOUTH DAILY 90 tablet 0    Loratadine (CLARITIN PO) Take by mouth      atorvastatin (LIPITOR) 10 MG tablet Take 1 tablet by mouth daily 90 tablet 3    aspirin EC 81 MG EC tablet Take 1 tablet by mouth daily 90 tablet 3    Esomeprazole Magnesium (NEXIUM PO) Take by mouth       No current facility-administered medications for this visit. Allergies   Allergen Reactions    Pcn [Penicillins]      As a child       Social History     Tobacco Use    Smoking status: Never Smoker    Smokeless tobacco: Never Used   Substance Use Topics    Alcohol use: No       OBJECTIVE:  S1 and S2 normal, no murmurs, clicks, gallops or rubs. Regular rate and rhythm. Chest is clear; no wheezes or rales. No edema or JVD. ASSESSMENT:     Diagnosis Orders   1. Essential hypertension  Comprehensive Metabolic Panel   2. Hyperlipidemia, unspecified hyperlipidemia type  Lipid Panel    Comprehensive Metabolic Panel   3. Need for hepatitis C screening test  Hepatitis C Antibody         Plan:  1)  Medication: continue current medication regimen unchanged  2)  Recheck in 6 months, sooner should new symptoms or problems arise. Discussed need for colonoscopy.

## 2021-03-26 DIAGNOSIS — R74.8 ALKALINE PHOSPHATASE ELEVATION: Primary | ICD-10-CM

## 2021-05-03 RX ORDER — AMLODIPINE BESYLATE 10 MG/1
TABLET ORAL
Qty: 30 TABLET | Refills: 5 | Status: SHIPPED | OUTPATIENT
Start: 2021-05-03 | End: 2021-11-08

## 2021-05-03 NOTE — TELEPHONE ENCOUNTER
Luiza Mazariegos is requesting refill(s) amlodipine  Last OV 3/23/21 (pertaining to medication)  LR 2/1/21 (per medication requested)  Next office visit scheduled or attempted No   If no, reason:  Pt is due in September

## 2021-06-24 ENCOUNTER — TELEPHONE (OUTPATIENT)
Dept: CARDIOLOGY CLINIC | Age: 56
End: 2021-06-24

## 2021-06-24 RX ORDER — ATORVASTATIN CALCIUM 10 MG/1
TABLET, FILM COATED ORAL
Qty: 90 TABLET | Refills: 1 | Status: SHIPPED | OUTPATIENT
Start: 2021-06-24 | End: 2022-02-11

## 2021-07-01 NOTE — TELEPHONE ENCOUNTER
07/01-Called ([phone]) unable to make contact, BRYANT VM for pt to contact I to schedule 1yr ov

## 2021-07-20 RX ORDER — ASPIRIN 81 MG/1
TABLET, COATED ORAL
Qty: 90 TABLET | Refills: 1 | Status: SHIPPED | OUTPATIENT
Start: 2021-07-20 | End: 2022-09-02

## 2021-11-04 ENCOUNTER — TELEPHONE (OUTPATIENT)
Dept: FAMILY MEDICINE CLINIC | Age: 56
End: 2021-11-04

## 2021-11-04 NOTE — TELEPHONE ENCOUNTER
----- Message from Joni Crawford sent at 11/4/2021  2:31 PM EDT -----  Subject: Message to Provider    QUESTIONS  Information for Provider? Patient returned call in regards to scheduling   an appointment. Patient will call back, started new job works early   morning until late at night.  ---------------------------------------------------------------------------  --------------  [address]  What is the best way for the office to contact you? OK to leave message on   voicemail  Preferred Call Back Phone Number? [phone]  ---------------------------------------------------------------------------  --------------  SCRIPT ANSWERS  Relationship to Patient?  Self

## 2021-11-08 RX ORDER — AMLODIPINE BESYLATE 10 MG/1
TABLET ORAL
Qty: 30 TABLET | Refills: 0 | Status: SHIPPED | OUTPATIENT
Start: 2021-11-08 | End: 2021-12-06

## 2021-11-08 NOTE — TELEPHONE ENCOUNTER
Edward Evans [phone] (home)    is requesting refill(s) of medication Amlodipine to preferred pharmacy [facility] 3/23/21 (pertaining to medication)   Last refill 5/3/21 (per medication requested)  Next office visit scheduled or attempted No  Date   If No, reason Pt was due in September.  Left vm x 2

## 2021-12-06 RX ORDER — AMLODIPINE BESYLATE 10 MG/1
TABLET ORAL
Qty: 30 TABLET | Refills: 0 | Status: SHIPPED | OUTPATIENT
Start: 2021-12-06 | End: 2022-01-05

## 2021-12-06 NOTE — TELEPHONE ENCOUNTER
[place] Gar[place]di [phone] (home)    is requesting refill(s) of medication Amlodipine to preferred pharmacy [facility] 4/23/21 (pertaining to medication)   Last refill 11/8/21 (per medication requested)  Next office visit scheduled or attempted No  Date   If No, reason pt was due in September.  Left vm x 3

## 2022-01-05 RX ORDER — AMLODIPINE BESYLATE 10 MG/1
TABLET ORAL
Qty: 30 TABLET | Refills: 1 | Status: SHIPPED | OUTPATIENT
Start: 2022-01-05 | End: 2022-02-11 | Stop reason: SDUPTHER

## 2022-01-05 NOTE — LETTER
Jhony Messinadavid [address] [facility]  Dept: [phone]  Dept Fax: [facility] [phone] [facility] [address] [address]          1/5/2022    Dear  Lolly Yolette: We were unable to reach you by phone. Please call our office at your earliest convenience. This message is regarding:  Appointment     Please call between the hours of 8:30am and 5:00pm Monday through Friday if possible. Our number is [phone]      Thank you.        YINKA Mike

## 2022-01-05 NOTE — TELEPHONE ENCOUNTER
Fide Malik [phone] (home)    is requesting refill(s) of medication [facility] 3/23/21 (pertaining to medication)   Last refill 12/6/21 (per medication requested)  Next office visit scheduled or attempted No  Date   If No, reason Pt was due in September. Left vm.   X 4

## 2022-01-05 NOTE — TELEPHONE ENCOUNTER
----- Message from Maryanne Armstrong sent at 1/5/2022 12:16 PM EST -----  Subject: Message to Provider    QUESTIONS  Information for Provider? pt of Dr. Giovanny Mcmahon has an appt on 2/7 needs his   medication for blood pressure refilled before appt pt doesnt know what   medication he is on pt pharmacy [facility]   ---------------------------------------------------------------------------  --------------  [address]  What is the best way for the office to contact you? OK to leave message on   voicemail  Preferred Call Back Phone Number? [phone]  ---------------------------------------------------------------------------  --------------  SCRIPT ANSWERS  Relationship to Patient?  Self

## 2022-02-11 ENCOUNTER — OFFICE VISIT (OUTPATIENT)
Dept: FAMILY MEDICINE CLINIC | Age: 57
End: 2022-02-11
Payer: COMMERCIAL

## 2022-02-11 VITALS
BODY MASS INDEX: 33.57 KG/M2 | HEIGHT: 71 IN | WEIGHT: 239.8 LBS | OXYGEN SATURATION: 98 % | HEART RATE: 78 BPM | DIASTOLIC BLOOD PRESSURE: 82 MMHG | SYSTOLIC BLOOD PRESSURE: 122 MMHG

## 2022-02-11 DIAGNOSIS — E78.5 HYPERLIPIDEMIA, UNSPECIFIED HYPERLIPIDEMIA TYPE: ICD-10-CM

## 2022-02-11 DIAGNOSIS — I10 ESSENTIAL HYPERTENSION: Primary | ICD-10-CM

## 2022-02-11 DIAGNOSIS — Z12.5 PROSTATE CANCER SCREENING: ICD-10-CM

## 2022-02-11 LAB
A/G RATIO: 1.9 (ref 1.1–2.2)
ALBUMIN SERPL-MCNC: 3.9 G/DL (ref 3.4–5)
ALP BLD-CCNC: 87 U/L (ref 40–129)
ALT SERPL-CCNC: 15 U/L (ref 10–40)
ANION GAP SERPL CALCULATED.3IONS-SCNC: 11 MMOL/L (ref 3–16)
AST SERPL-CCNC: 11 U/L (ref 15–37)
BILIRUB SERPL-MCNC: <0.2 MG/DL (ref 0–1)
BUN BLDV-MCNC: 10 MG/DL (ref 7–20)
CALCIUM SERPL-MCNC: 8.8 MG/DL (ref 8.3–10.6)
CHLORIDE BLD-SCNC: 106 MMOL/L (ref 99–110)
CHOLESTEROL, TOTAL: 155 MG/DL (ref 0–199)
CO2: 25 MMOL/L (ref 21–32)
CREAT SERPL-MCNC: 0.9 MG/DL (ref 0.9–1.3)
GFR AFRICAN AMERICAN: >60
GFR NON-AFRICAN AMERICAN: >60
GLUCOSE BLD-MCNC: 90 MG/DL (ref 70–99)
HDLC SERPL-MCNC: 49 MG/DL (ref 40–60)
LDL CHOLESTEROL CALCULATED: 95 MG/DL
POTASSIUM SERPL-SCNC: 4.7 MMOL/L (ref 3.5–5.1)
PROSTATE SPECIFIC ANTIGEN: 1.3 NG/ML (ref 0–4)
SODIUM BLD-SCNC: 142 MMOL/L (ref 136–145)
TOTAL PROTEIN: 6 G/DL (ref 6.4–8.2)
TRIGL SERPL-MCNC: 56 MG/DL (ref 0–150)
VLDLC SERPL CALC-MCNC: 11 MG/DL

## 2022-02-11 PROCEDURE — 99213 OFFICE O/P EST LOW 20 MIN: CPT | Performed by: PHYSICIAN ASSISTANT

## 2022-02-11 RX ORDER — AMLODIPINE BESYLATE 10 MG/1
TABLET ORAL
Qty: 90 TABLET | Refills: 1 | Status: SHIPPED | OUTPATIENT
Start: 2022-02-11 | End: 2022-09-02 | Stop reason: SDUPTHER

## 2022-02-11 ASSESSMENT — PATIENT HEALTH QUESTIONNAIRE - PHQ9
1. LITTLE INTEREST OR PLEASURE IN DOING THINGS: 0
SUM OF ALL RESPONSES TO PHQ QUESTIONS 1-9: 0
SUM OF ALL RESPONSES TO PHQ QUESTIONS 1-9: 0
SUM OF ALL RESPONSES TO PHQ9 QUESTIONS 1 & 2: 0
2. FEELING DOWN, DEPRESSED OR HOPELESS: 0
SUM OF ALL RESPONSES TO PHQ QUESTIONS 1-9: 0
SUM OF ALL RESPONSES TO PHQ QUESTIONS 1-9: 0

## 2022-02-11 NOTE — PROGRESS NOTES
SUBJECTIVE:  Shama Vazquez is a 64 y.o. male who presents for evaluation of hypertension and hyperlipidemia. He indicates that he is feeling well and denies any symptoms referable to his elevated blood pressure. Specifically denies chest pain, palpitations, dyspnea, orthopnea, PND or peripheral edema. No anorexia, arthralgia, or leg cramps noted. Current medication regimen is as listed below. He denies any side effects of medication, and has been taking it regularly. Current Outpatient Medications   Medication Sig Dispense Refill    amLODIPine (NORVASC) 10 MG tablet TAKE ONE TABLET BY MOUTH DAILY 30 tablet 1    ASPIRIN LOW DOSE 81 MG EC tablet TAKE ONE TABLET BY MOUTH DAILY 90 tablet 1     No current facility-administered medications for this visit. Allergies   Allergen Reactions    Pcn [Penicillins]      As a child       Social History     Tobacco Use    Smoking status: Never Smoker    Smokeless tobacco: Never Used   Substance Use Topics    Alcohol use: No       OBJECTIVE:  S1 and S2 normal, no murmurs, clicks, gallops or rubs. Regular rate and rhythm. Chest is clear; no wheezes or rales. No edema or JVD. ASSESSMENT:   Diagnosis Orders   1. Essential hypertension  Comprehensive Metabolic Panel   2. Hyperlipidemia, unspecified hyperlipidemia type  Lipid Panel    Comprehensive Metabolic Panel   3. Prostate cancer screening  PSA screening         Plan:  1)  Medication: continue current medication regimen unchanged  2)  Recheck in 6 months, sooner should new symptoms or problems arise.     Strongly encouraged colonoscopy, # given again today

## 2022-09-02 ENCOUNTER — OFFICE VISIT (OUTPATIENT)
Dept: FAMILY MEDICINE CLINIC | Age: 57
End: 2022-09-02
Payer: COMMERCIAL

## 2022-09-02 VITALS
BODY MASS INDEX: 33.6 KG/M2 | WEIGHT: 240 LBS | DIASTOLIC BLOOD PRESSURE: 70 MMHG | OXYGEN SATURATION: 97 % | HEIGHT: 71 IN | HEART RATE: 66 BPM | SYSTOLIC BLOOD PRESSURE: 118 MMHG

## 2022-09-02 DIAGNOSIS — I10 ESSENTIAL HYPERTENSION: Primary | ICD-10-CM

## 2022-09-02 PROCEDURE — 99213 OFFICE O/P EST LOW 20 MIN: CPT | Performed by: PHYSICIAN ASSISTANT

## 2022-09-02 RX ORDER — ASPIRIN 81 MG/1
TABLET ORAL
Qty: 90 TABLET | Refills: 1 | Status: CANCELLED | OUTPATIENT
Start: 2022-09-02

## 2022-09-02 RX ORDER — AMLODIPINE BESYLATE 10 MG/1
TABLET ORAL
Qty: 90 TABLET | Refills: 1 | Status: SHIPPED | OUTPATIENT
Start: 2022-09-02

## 2022-09-02 SDOH — ECONOMIC STABILITY: FOOD INSECURITY: WITHIN THE PAST 12 MONTHS, YOU WORRIED THAT YOUR FOOD WOULD RUN OUT BEFORE YOU GOT MONEY TO BUY MORE.: NEVER TRUE

## 2022-09-02 SDOH — ECONOMIC STABILITY: FOOD INSECURITY: WITHIN THE PAST 12 MONTHS, THE FOOD YOU BOUGHT JUST DIDN'T LAST AND YOU DIDN'T HAVE MONEY TO GET MORE.: NEVER TRUE

## 2022-09-02 ASSESSMENT — PATIENT HEALTH QUESTIONNAIRE - PHQ9
9. THOUGHTS THAT YOU WOULD BE BETTER OFF DEAD, OR OF HURTING YOURSELF: 0
5. POOR APPETITE OR OVEREATING: 0
1. LITTLE INTEREST OR PLEASURE IN DOING THINGS: 0
3. TROUBLE FALLING OR STAYING ASLEEP: 0
SUM OF ALL RESPONSES TO PHQ QUESTIONS 1-9: 0
SUM OF ALL RESPONSES TO PHQ9 QUESTIONS 1 & 2: 0
SUM OF ALL RESPONSES TO PHQ QUESTIONS 1-9: 0
4. FEELING TIRED OR HAVING LITTLE ENERGY: 0
8. MOVING OR SPEAKING SO SLOWLY THAT OTHER PEOPLE COULD HAVE NOTICED. OR THE OPPOSITE, BEING SO FIGETY OR RESTLESS THAT YOU HAVE BEEN MOVING AROUND A LOT MORE THAN USUAL: 0
6. FEELING BAD ABOUT YOURSELF - OR THAT YOU ARE A FAILURE OR HAVE LET YOURSELF OR YOUR FAMILY DOWN: 0
2. FEELING DOWN, DEPRESSED OR HOPELESS: 0
10. IF YOU CHECKED OFF ANY PROBLEMS, HOW DIFFICULT HAVE THESE PROBLEMS MADE IT FOR YOU TO DO YOUR WORK, TAKE CARE OF THINGS AT HOME, OR GET ALONG WITH OTHER PEOPLE: 0
7. TROUBLE CONCENTRATING ON THINGS, SUCH AS READING THE NEWSPAPER OR WATCHING TELEVISION: 0

## 2022-09-02 ASSESSMENT — SOCIAL DETERMINANTS OF HEALTH (SDOH): HOW HARD IS IT FOR YOU TO PAY FOR THE VERY BASICS LIKE FOOD, HOUSING, MEDICAL CARE, AND HEATING?: NOT HARD AT ALL

## 2022-09-06 RX ORDER — AMLODIPINE BESYLATE 10 MG/1
TABLET ORAL
Qty: 90 TABLET | Refills: 1 | OUTPATIENT
Start: 2022-09-06

## 2023-03-06 RX ORDER — AMLODIPINE BESYLATE 10 MG/1
TABLET ORAL
Qty: 90 TABLET | Refills: 1 | Status: SHIPPED | OUTPATIENT
Start: 2023-03-06

## 2023-03-06 NOTE — TELEPHONE ENCOUNTER
Angi Munguia [phone] (home)    is requesting refill(s) of medication Amlodipine to preferred pharmacy [facility] 9/2/22 (pertaining to medication)   Last refill  9/2/22(per medication requested)  Next office visit scheduled or attempted Yes  Date 3/3/23  If No, reason

## 2023-03-17 ENCOUNTER — OFFICE VISIT (OUTPATIENT)
Dept: FAMILY MEDICINE CLINIC | Age: 58
End: 2023-03-17
Payer: COMMERCIAL

## 2023-03-17 VITALS
HEIGHT: 71 IN | HEART RATE: 94 BPM | BODY MASS INDEX: 34.89 KG/M2 | DIASTOLIC BLOOD PRESSURE: 86 MMHG | SYSTOLIC BLOOD PRESSURE: 136 MMHG | WEIGHT: 249.2 LBS | OXYGEN SATURATION: 95 %

## 2023-03-17 DIAGNOSIS — I10 ESSENTIAL HYPERTENSION: Primary | ICD-10-CM

## 2023-03-17 PROCEDURE — 3075F SYST BP GE 130 - 139MM HG: CPT | Performed by: PHYSICIAN ASSISTANT

## 2023-03-17 PROCEDURE — 99213 OFFICE O/P EST LOW 20 MIN: CPT | Performed by: PHYSICIAN ASSISTANT

## 2023-03-17 PROCEDURE — 3079F DIAST BP 80-89 MM HG: CPT | Performed by: PHYSICIAN ASSISTANT

## 2023-03-17 RX ORDER — CLOTRIMAZOLE AND BETAMETHASONE DIPROPIONATE 10; .64 MG/G; MG/G
CREAM TOPICAL
Qty: 15 G | Refills: 0 | Status: SHIPPED | OUTPATIENT
Start: 2023-03-17

## 2023-03-17 ASSESSMENT — PATIENT HEALTH QUESTIONNAIRE - PHQ9
SUM OF ALL RESPONSES TO PHQ QUESTIONS 1-9: 0
2. FEELING DOWN, DEPRESSED OR HOPELESS: 0
SUM OF ALL RESPONSES TO PHQ QUESTIONS 1-9: 0
1. LITTLE INTEREST OR PLEASURE IN DOING THINGS: 0
SUM OF ALL RESPONSES TO PHQ9 QUESTIONS 1 & 2: 0

## 2023-03-17 NOTE — PROGRESS NOTES
Subjective: Prasanth Lopez is a 62 y.o. male with hypertension. Current Outpatient Medications   Medication Sig Dispense Refill    amLODIPine (NORVASC) 10 MG tablet TAKE ONE TABLET BY MOUTH DAILY 90 tablet 1     No current facility-administered medications for this visit. Hypertension ROS: taking medications as instructed, no medication side effects noted, no TIA's, no chest pain on exertion, no dyspnea on exertion, no swelling of ankles. New concerns: none today. Objective:   /86 (Site: Left Upper Arm, Position: Sitting)   Pulse 94   Ht 5' 11\" (1.803 m)   Wt 249 lb 3.2 oz (113 kg)   SpO2 95%   BMI 34.76 kg/m²    Appearance alert, well appearing, and in no distress, oriented to person, place, and time, and overweight. General exam BP noted to be well controlled today in office, S1, S2 normal, no gallop, no murmur, chest clear, no JVD, no HSM, no edema. Assessment/Plan:     Diagnosis Orders   1.  Essential hypertension  Continue norvasc 10mg, BP well controlled      OV 6 months fasting

## 2023-09-29 ENCOUNTER — OFFICE VISIT (OUTPATIENT)
Dept: FAMILY MEDICINE CLINIC | Age: 58
End: 2023-09-29

## 2023-09-29 VITALS
RESPIRATION RATE: 18 BRPM | BODY MASS INDEX: 35 KG/M2 | OXYGEN SATURATION: 95 % | HEART RATE: 62 BPM | WEIGHT: 250 LBS | HEIGHT: 71 IN | SYSTOLIC BLOOD PRESSURE: 122 MMHG | DIASTOLIC BLOOD PRESSURE: 76 MMHG

## 2023-09-29 DIAGNOSIS — I10 ESSENTIAL HYPERTENSION: Primary | ICD-10-CM

## 2023-09-29 DIAGNOSIS — E78.5 HYPERLIPIDEMIA, UNSPECIFIED HYPERLIPIDEMIA TYPE: ICD-10-CM

## 2023-09-29 LAB
ALBUMIN SERPL-MCNC: 4 G/DL (ref 3.4–5)
ALBUMIN/GLOB SERPL: 1.7 {RATIO} (ref 1.1–2.2)
ALP SERPL-CCNC: 95 U/L (ref 40–129)
ALT SERPL-CCNC: 23 U/L (ref 10–40)
ANION GAP SERPL CALCULATED.3IONS-SCNC: 11 MMOL/L (ref 3–16)
AST SERPL-CCNC: 13 U/L (ref 15–37)
BILIRUB SERPL-MCNC: <0.2 MG/DL (ref 0–1)
BUN SERPL-MCNC: 13 MG/DL (ref 7–20)
CALCIUM SERPL-MCNC: 8.8 MG/DL (ref 8.3–10.6)
CHLORIDE SERPL-SCNC: 105 MMOL/L (ref 99–110)
CHOLEST SERPL-MCNC: 166 MG/DL (ref 0–199)
CO2 SERPL-SCNC: 26 MMOL/L (ref 21–32)
CREAT SERPL-MCNC: 1 MG/DL (ref 0.9–1.3)
GFR SERPLBLD CREATININE-BSD FMLA CKD-EPI: >60 ML/MIN/{1.73_M2}
GLUCOSE SERPL-MCNC: 99 MG/DL (ref 70–99)
HDLC SERPL-MCNC: 47 MG/DL (ref 40–60)
LDLC SERPL CALC-MCNC: 103 MG/DL
POTASSIUM SERPL-SCNC: 5 MMOL/L (ref 3.5–5.1)
PROT SERPL-MCNC: 6.4 G/DL (ref 6.4–8.2)
SODIUM SERPL-SCNC: 142 MMOL/L (ref 136–145)
TRIGL SERPL-MCNC: 78 MG/DL (ref 0–150)
VLDLC SERPL CALC-MCNC: 16 MG/DL

## 2023-09-29 PROCEDURE — 36415 COLL VENOUS BLD VENIPUNCTURE: CPT | Performed by: PHYSICIAN ASSISTANT

## 2023-09-29 PROCEDURE — 99213 OFFICE O/P EST LOW 20 MIN: CPT | Performed by: PHYSICIAN ASSISTANT

## 2023-09-29 PROCEDURE — 3078F DIAST BP <80 MM HG: CPT | Performed by: PHYSICIAN ASSISTANT

## 2023-09-29 PROCEDURE — 3074F SYST BP LT 130 MM HG: CPT | Performed by: PHYSICIAN ASSISTANT

## 2023-09-29 RX ORDER — AMLODIPINE BESYLATE 10 MG/1
10 TABLET ORAL DAILY
Qty: 90 TABLET | Refills: 1 | Status: SHIPPED | OUTPATIENT
Start: 2023-09-29

## 2023-09-29 SDOH — ECONOMIC STABILITY: HOUSING INSECURITY
IN THE LAST 12 MONTHS, WAS THERE A TIME WHEN YOU DID NOT HAVE A STEADY PLACE TO SLEEP OR SLEPT IN A SHELTER (INCLUDING NOW)?: PATIENT REFUSED

## 2023-09-29 SDOH — ECONOMIC STABILITY: FOOD INSECURITY: WITHIN THE PAST 12 MONTHS, THE FOOD YOU BOUGHT JUST DIDN'T LAST AND YOU DIDN'T HAVE MONEY TO GET MORE.: PATIENT DECLINED

## 2023-09-29 SDOH — ECONOMIC STABILITY: FOOD INSECURITY: WITHIN THE PAST 12 MONTHS, YOU WORRIED THAT YOUR FOOD WOULD RUN OUT BEFORE YOU GOT MONEY TO BUY MORE.: PATIENT DECLINED

## 2023-09-29 SDOH — ECONOMIC STABILITY: INCOME INSECURITY: HOW HARD IS IT FOR YOU TO PAY FOR THE VERY BASICS LIKE FOOD, HOUSING, MEDICAL CARE, AND HEATING?: PATIENT DECLINED

## 2023-09-29 ASSESSMENT — PATIENT HEALTH QUESTIONNAIRE - PHQ9
4. FEELING TIRED OR HAVING LITTLE ENERGY: 0
6. FEELING BAD ABOUT YOURSELF - OR THAT YOU ARE A FAILURE OR HAVE LET YOURSELF OR YOUR FAMILY DOWN: 0
5. POOR APPETITE OR OVEREATING: 0
1. LITTLE INTEREST OR PLEASURE IN DOING THINGS: 0
7. TROUBLE CONCENTRATING ON THINGS, SUCH AS READING THE NEWSPAPER OR WATCHING TELEVISION: 0
SUM OF ALL RESPONSES TO PHQ QUESTIONS 1-9: 0
2. FEELING DOWN, DEPRESSED OR HOPELESS: 0
8. MOVING OR SPEAKING SO SLOWLY THAT OTHER PEOPLE COULD HAVE NOTICED. OR THE OPPOSITE, BEING SO FIGETY OR RESTLESS THAT YOU HAVE BEEN MOVING AROUND A LOT MORE THAN USUAL: 0
9. THOUGHTS THAT YOU WOULD BE BETTER OFF DEAD, OR OF HURTING YOURSELF: 0
SUM OF ALL RESPONSES TO PHQ9 QUESTIONS 1 & 2: 0
10. IF YOU CHECKED OFF ANY PROBLEMS, HOW DIFFICULT HAVE THESE PROBLEMS MADE IT FOR YOU TO DO YOUR WORK, TAKE CARE OF THINGS AT HOME, OR GET ALONG WITH OTHER PEOPLE: 0
3. TROUBLE FALLING OR STAYING ASLEEP: 0

## 2023-09-29 NOTE — PROGRESS NOTES
SUBJECTIVE:  Deb Sloan is a 62 y.o. male who presents for evaluation of hypertension and hyperlipidemia. He indicates that he is feeling well and denies any symptoms referable to his elevated blood pressure. Specifically denies chest pain, palpitations, dyspnea, orthopnea, PND or peripheral edema. No anorexia, arthralgia, or leg cramps noted. Current medication regimen is as listed below. He denies any side effects of medication, and has been taking it regularly. Current Outpatient Medications   Medication Sig Dispense Refill    clotrimazole-betamethasone (LOTRISONE) 1-0.05 % cream Apply topically 2 times daily. 15 g 0    amLODIPine (NORVASC) 10 MG tablet TAKE ONE TABLET BY MOUTH DAILY 90 tablet 1     No current facility-administered medications for this visit. Allergies   Allergen Reactions    Pcn [Penicillins]      As a child       Social History     Tobacco Use    Smoking status: Never    Smokeless tobacco: Never   Substance Use Topics    Alcohol use: No       OBJECTIVE:  S1 and S2 normal, no murmurs, clicks, gallops or rubs. Regular rate and rhythm. Chest is clear; no wheezes or rales. No edema or JVD. ASSESSMENT/Plan:     Diagnosis Orders   1. Essential hypertension  Comprehensive Metabolic Panel  Well controlled with norvasc      2.  Hyperlipidemia, unspecified hyperlipidemia type  Lipid Panel- diet controlled    Comprehensive Metabolic Panel

## 2024-04-01 ENCOUNTER — OFFICE VISIT (OUTPATIENT)
Dept: FAMILY MEDICINE CLINIC | Age: 59
End: 2024-04-01

## 2024-04-01 VITALS
HEART RATE: 73 BPM | DIASTOLIC BLOOD PRESSURE: 88 MMHG | WEIGHT: 254.8 LBS | HEIGHT: 71 IN | RESPIRATION RATE: 16 BRPM | BODY MASS INDEX: 35.67 KG/M2 | SYSTOLIC BLOOD PRESSURE: 120 MMHG | OXYGEN SATURATION: 96 %

## 2024-04-01 DIAGNOSIS — I10 ESSENTIAL HYPERTENSION: Primary | ICD-10-CM

## 2024-04-01 DIAGNOSIS — N63.42 SUBAREOLAR MASS OF LEFT BREAST: ICD-10-CM

## 2024-04-01 PROCEDURE — 3079F DIAST BP 80-89 MM HG: CPT | Performed by: PHYSICIAN ASSISTANT

## 2024-04-01 PROCEDURE — 3074F SYST BP LT 130 MM HG: CPT | Performed by: PHYSICIAN ASSISTANT

## 2024-04-01 PROCEDURE — 99214 OFFICE O/P EST MOD 30 MIN: CPT | Performed by: PHYSICIAN ASSISTANT

## 2024-04-01 RX ORDER — AMLODIPINE BESYLATE 10 MG/1
10 TABLET ORAL DAILY
Qty: 90 TABLET | Refills: 1 | Status: SHIPPED | OUTPATIENT
Start: 2024-04-01

## 2024-04-01 ASSESSMENT — PATIENT HEALTH QUESTIONNAIRE - PHQ9
2. FEELING DOWN, DEPRESSED OR HOPELESS: NOT AT ALL
SUM OF ALL RESPONSES TO PHQ QUESTIONS 1-9: 0
SUM OF ALL RESPONSES TO PHQ QUESTIONS 1-9: 0
1. LITTLE INTEREST OR PLEASURE IN DOING THINGS: NOT AT ALL
SUM OF ALL RESPONSES TO PHQ9 QUESTIONS 1 & 2: 0
SUM OF ALL RESPONSES TO PHQ QUESTIONS 1-9: 0
SUM OF ALL RESPONSES TO PHQ QUESTIONS 1-9: 0

## 2024-04-01 NOTE — PROGRESS NOTES
Subjective:   Federico Ramos is a 58 y.o. male with hypertension.    Has noticed a lump left chest wall that is tender starting in November. It can swell and get the size of a golf ball and other times it will flatten out. No erythema  Current Outpatient Medications   Medication Sig Dispense Refill    amLODIPine (NORVASC) 10 MG tablet Take 1 tablet by mouth daily 90 tablet 1    clotrimazole-betamethasone (LOTRISONE) 1-0.05 % cream Apply topically 2 times daily. 15 g 0     No current facility-administered medications for this visit.      Hypertension ROS: taking medications as instructed, no medication side effects noted, no TIA's, no chest pain on exertion, no dyspnea on exertion, no swelling of ankles.       Objective:   /88   Pulse 73   Resp 16   Ht 1.803 m (5' 10.98\")   Wt 115.6 kg (254 lb 12.8 oz)   SpO2 96%   BMI 35.55 kg/m²    Appearance alert, well appearing, and in no distress, oriented to person, place, and time, and overweight.  General exam BP noted to be well controlled today in office, S1, S2 normal, no gallop, no murmur, chest clear, no JVD, no HSM, no edema.   Breast- left breast tender mass under areola      Assessment/Plan:     Diagnosis Orders   1. Essential hypertension  Well controlled on norvasc 10mg      2. Subareolar mass of left breast  US BREAST COMPLETE LEFT

## 2024-04-11 ENCOUNTER — TELEPHONE (OUTPATIENT)
Dept: WOMENS IMAGING | Age: 59
End: 2024-04-11

## 2024-04-11 NOTE — TELEPHONE ENCOUNTER
Staff requested Navigator reach out to patient to answer questions. Patient had requested Breast Ultrasound only. Went over protocol with patient that, for a complete evaluation, we start with Bilateral Mammogram for all symptomatic patients.  Based upon Mammogram, Radiologist will determine if Ultrasound will be done and often is done for palpable areas.  Patient understands and is agreeable to this plan.      Patient is self-pay.  Declines financial assistance programs at this time.  Will utilize discount by paying same-day.  Patient aware to reach Navigator if further testing were needed and financial becomes an issue.    Karuna Mauro, [place], CN-BM  Breast Health Patient Navigator  [facility]  [phone]

## 2024-04-12 ENCOUNTER — HOSPITAL ENCOUNTER (OUTPATIENT)
Dept: WOMENS IMAGING | Age: 59
End: 2024-04-12

## 2024-04-12 ENCOUNTER — HOSPITAL ENCOUNTER (OUTPATIENT)
Dept: WOMENS IMAGING | Age: 59
Discharge: HOME | End: 2024-04-12

## 2024-04-12 VITALS — WEIGHT: 240 LBS | HEIGHT: 70 IN | BODY MASS INDEX: 34.36 KG/M2

## 2024-04-12 DIAGNOSIS — N63.41 SUBAREOLAR MASS OF RIGHT BREAST: ICD-10-CM

## 2024-04-12 PROCEDURE — G0279 TOMOSYNTHESIS, MAMMO: HCPCS

## 2024-04-16 ENCOUNTER — TELEPHONE (OUTPATIENT)
Dept: FAMILY MEDICINE CLINIC | Age: 59
End: 2024-04-16

## 2024-04-16 RX ORDER — OLMESARTAN MEDOXOMIL 20 MG/1
20 TABLET ORAL DAILY
Qty: 30 TABLET | Refills: 1 | Status: SHIPPED | OUTPATIENT
Start: 2024-04-16

## 2024-04-16 NOTE — TELEPHONE ENCOUNTER
Patient called regarding his recent mammogram results, he said he was told he no longer needed the US since his mammogram was essentially normal but he says the radiologist did state that his medication could be the cause and he should discuss this with his PCP

## 2024-08-13 ENCOUNTER — TELEPHONE (OUTPATIENT)
Dept: FAMILY MEDICINE CLINIC | Age: 59
End: 2024-08-13

## 2024-08-13 RX ORDER — AMLODIPINE BESYLATE 10 MG/1
10 TABLET ORAL DAILY
Qty: 90 TABLET | Refills: 0 | Status: SHIPPED | OUTPATIENT
Start: 2024-08-13

## 2024-08-13 RX ORDER — AMLODIPINE BESYLATE 10 MG/1
10 TABLET ORAL DAILY
Qty: 90 TABLET | Refills: 1 | Status: CANCELLED | OUTPATIENT
Start: 2024-08-13

## 2024-10-29 ENCOUNTER — OFFICE VISIT (OUTPATIENT)
Dept: FAMILY MEDICINE CLINIC | Age: 59
End: 2024-10-29

## 2024-10-29 VITALS
SYSTOLIC BLOOD PRESSURE: 126 MMHG | DIASTOLIC BLOOD PRESSURE: 74 MMHG | BODY MASS INDEX: 33.58 KG/M2 | WEIGHT: 234.6 LBS | OXYGEN SATURATION: 97 % | HEIGHT: 70 IN | HEART RATE: 65 BPM

## 2024-10-29 DIAGNOSIS — I10 ESSENTIAL HYPERTENSION: Primary | ICD-10-CM

## 2024-10-29 DIAGNOSIS — Z12.5 PROSTATE CANCER SCREENING: ICD-10-CM

## 2024-10-29 DIAGNOSIS — E78.5 HYPERLIPIDEMIA, UNSPECIFIED HYPERLIPIDEMIA TYPE: ICD-10-CM

## 2024-10-29 LAB
ALBUMIN SERPL-MCNC: 3.8 G/DL (ref 3.4–5)
ALBUMIN/GLOB SERPL: 1.7 {RATIO} (ref 1.1–2.2)
ALP SERPL-CCNC: 101 U/L (ref 40–129)
ALT SERPL-CCNC: 21 U/L (ref 10–40)
ANION GAP SERPL CALCULATED.3IONS-SCNC: 9 MMOL/L (ref 3–16)
AST SERPL-CCNC: 17 U/L (ref 15–37)
BILIRUB SERPL-MCNC: <0.2 MG/DL (ref 0–1)
BUN SERPL-MCNC: 12 MG/DL (ref 7–20)
CALCIUM SERPL-MCNC: 9.1 MG/DL (ref 8.3–10.6)
CHLORIDE SERPL-SCNC: 106 MMOL/L (ref 99–110)
CHOLEST SERPL-MCNC: 163 MG/DL (ref 0–199)
CO2 SERPL-SCNC: 25 MMOL/L (ref 21–32)
CREAT SERPL-MCNC: 1 MG/DL (ref 0.9–1.3)
GFR SERPLBLD CREATININE-BSD FMLA CKD-EPI: 86 ML/MIN/{1.73_M2}
GLUCOSE SERPL-MCNC: 97 MG/DL (ref 70–99)
HDLC SERPL-MCNC: 45 MG/DL (ref 40–60)
LDLC SERPL CALC-MCNC: 100 MG/DL
POTASSIUM SERPL-SCNC: 4.7 MMOL/L (ref 3.5–5.1)
PROT SERPL-MCNC: 6 G/DL (ref 6.4–8.2)
PSA SERPL DL<=0.01 NG/ML-MCNC: 1.64 NG/ML (ref 0–4)
SODIUM SERPL-SCNC: 140 MMOL/L (ref 136–145)
TRIGL SERPL-MCNC: 89 MG/DL (ref 0–150)
VLDLC SERPL CALC-MCNC: 18 MG/DL

## 2024-10-29 PROCEDURE — 3074F SYST BP LT 130 MM HG: CPT | Performed by: PHYSICIAN ASSISTANT

## 2024-10-29 PROCEDURE — 36415 COLL VENOUS BLD VENIPUNCTURE: CPT | Performed by: PHYSICIAN ASSISTANT

## 2024-10-29 PROCEDURE — 3078F DIAST BP <80 MM HG: CPT | Performed by: PHYSICIAN ASSISTANT

## 2024-10-29 PROCEDURE — 99213 OFFICE O/P EST LOW 20 MIN: CPT | Performed by: PHYSICIAN ASSISTANT

## 2024-10-29 SDOH — ECONOMIC STABILITY: FOOD INSECURITY: WITHIN THE PAST 12 MONTHS, YOU WORRIED THAT YOUR FOOD WOULD RUN OUT BEFORE YOU GOT MONEY TO BUY MORE.: NEVER TRUE

## 2024-10-29 SDOH — ECONOMIC STABILITY: FOOD INSECURITY: WITHIN THE PAST 12 MONTHS, THE FOOD YOU BOUGHT JUST DIDN'T LAST AND YOU DIDN'T HAVE MONEY TO GET MORE.: NEVER TRUE

## 2024-10-29 SDOH — ECONOMIC STABILITY: INCOME INSECURITY: HOW HARD IS IT FOR YOU TO PAY FOR THE VERY BASICS LIKE FOOD, HOUSING, MEDICAL CARE, AND HEATING?: NOT HARD AT ALL

## 2024-10-29 NOTE — PROGRESS NOTES
SUBJECTIVE:  Federico Ramos is a 59 y.o. male who presents for evaluation of hypertension and hyperlipidemia. He indicates that he is feeling well and denies any symptoms referable to his elevated blood pressure.   Specifically denies chest pain, palpitations, dyspnea, orthopnea, PND or peripheral edema.  No anorexia, arthralgia, or leg cramps noted. Current medication regimen is as listed below. He denies any side effects of medication, and has been taking it regularly.    Current Outpatient Medications   Medication Sig Dispense Refill    amLODIPine (NORVASC) 10 MG tablet Take 1 tablet by mouth daily 90 tablet 0    clotrimazole-betamethasone (LOTRISONE) 1-0.05 % cream Apply topically 2 times daily. 15 g 0     No current facility-administered medications for this visit.       Allergies   Allergen Reactions    Pcn [Penicillins]      As a child       Social History     Tobacco Use    Smoking status: Never    Smokeless tobacco: Never   Substance Use Topics    Alcohol use: No       OBJECTIVE:  S1 and S2 normal, no murmurs, clicks, gallops or rubs. Regular rate and rhythm. Chest is clear; no wheezes or rales. No edema or JVD.    ASSESSMENT/Plan:       Diagnosis Orders   1. Essential hypertension  Comprehensive Metabolic Panel- well controlled on norvasc      2. Hyperlipidemia, unspecified hyperlipidemia type  Lipid Panel- diet controlled    Comprehensive Metabolic Panel      3. Prostate cancer screening  PSA Screening

## 2024-11-11 RX ORDER — AMLODIPINE BESYLATE 10 MG/1
10 TABLET ORAL DAILY
Qty: 90 TABLET | Refills: 1 | Status: SHIPPED | OUTPATIENT
Start: 2024-11-11

## 2024-11-11 NOTE — TELEPHONE ENCOUNTER
Federico Ramos is requesting refill(s) amlodipine  Last OV 10/29/24 (pertaining to medication)  LR 8/13/24 (per medication requested)  Next office visit scheduled or attempted Yes   If no, reason:  4/28/25

## 2025-05-01 ENCOUNTER — OFFICE VISIT (OUTPATIENT)
Dept: FAMILY MEDICINE CLINIC | Age: 60
End: 2025-05-01

## 2025-05-01 VITALS
OXYGEN SATURATION: 96 % | SYSTOLIC BLOOD PRESSURE: 128 MMHG | RESPIRATION RATE: 16 BRPM | BODY MASS INDEX: 37.02 KG/M2 | DIASTOLIC BLOOD PRESSURE: 86 MMHG | HEIGHT: 70 IN | HEART RATE: 66 BPM | WEIGHT: 258.6 LBS

## 2025-05-01 DIAGNOSIS — I10 ESSENTIAL HYPERTENSION: Primary | ICD-10-CM

## 2025-05-01 PROCEDURE — 3074F SYST BP LT 130 MM HG: CPT | Performed by: PHYSICIAN ASSISTANT

## 2025-05-01 PROCEDURE — 3079F DIAST BP 80-89 MM HG: CPT | Performed by: PHYSICIAN ASSISTANT

## 2025-05-01 PROCEDURE — 99213 OFFICE O/P EST LOW 20 MIN: CPT | Performed by: PHYSICIAN ASSISTANT

## 2025-05-01 RX ORDER — AMLODIPINE BESYLATE 10 MG/1
10 TABLET ORAL DAILY
Qty: 90 TABLET | Refills: 1 | Status: SHIPPED | OUTPATIENT
Start: 2025-05-01

## 2025-05-01 SDOH — ECONOMIC STABILITY: FOOD INSECURITY: WITHIN THE PAST 12 MONTHS, THE FOOD YOU BOUGHT JUST DIDN'T LAST AND YOU DIDN'T HAVE MONEY TO GET MORE.: NEVER TRUE

## 2025-05-01 SDOH — ECONOMIC STABILITY: FOOD INSECURITY: WITHIN THE PAST 12 MONTHS, YOU WORRIED THAT YOUR FOOD WOULD RUN OUT BEFORE YOU GOT MONEY TO BUY MORE.: NEVER TRUE

## 2025-05-01 ASSESSMENT — PATIENT HEALTH QUESTIONNAIRE - PHQ9
1. LITTLE INTEREST OR PLEASURE IN DOING THINGS: NOT AT ALL
SUM OF ALL RESPONSES TO PHQ QUESTIONS 1-9: 0
2. FEELING DOWN, DEPRESSED OR HOPELESS: NOT AT ALL

## 2025-05-01 NOTE — PROGRESS NOTES
Subjective:   Federico Ramos is a 59 y.o. male with hypertension.  Current Outpatient Medications   Medication Sig Dispense Refill    amLODIPine (NORVASC) 10 MG tablet Take 1 tablet by mouth daily 90 tablet 1    clotrimazole-betamethasone (LOTRISONE) 1-0.05 % cream Apply topically 2 times daily. (Patient not taking: Reported on 5/1/2025) 15 g 0     No current facility-administered medications for this visit.      Hypertension ROS: taking medications as instructed, no medication side effects noted, no TIA's, no chest pain on exertion, no dyspnea on exertion, no swelling of ankles.   New concerns: no concerns today. Declines any colon cancer screening.     Objective:   /86   Pulse 66   Resp 16   Ht 1.778 m (5' 10\")   Wt 117.3 kg (258 lb 9.6 oz)   SpO2 96%   BMI 37.11 kg/m²    Appearance alert, well appearing, and in no distress, oriented to person, place, and time, and overweight.  General exam BP noted to be well controlled today in office, S1, S2 normal, no gallop, no murmur, chest clear, no JVD, no HSM, no edema.       Assessment/Plan:     Diagnosis Orders   1. Essential hypertension  Well controlled on norvasc

## 2025-05-06 PROBLEM — I82.409 DVT (DEEP VENOUS THROMBOSIS) (HCC): Status: ACTIVE | Noted: 2025-05-03

## 2025-05-12 ENCOUNTER — TELEPHONE (OUTPATIENT)
Dept: FAMILY MEDICINE CLINIC | Age: 60
End: 2025-05-12

## 2025-05-12 NOTE — TELEPHONE ENCOUNTER
Spoke with patient he is on eliquis and is scheduled to see vascular on 5/27, no issyes with eliquis

## 2025-05-12 NOTE — TELEPHONE ENCOUNTER
Pt seen in [facility] ED 5/3 and dx with blood clots in legs. He states he was referred to vascular specialist. He states they could not get him in until 5/27, but his after visit summary states to see them within 3 days. He also states that he got a letter from [facility] saying he needs to go back to the ED if he can't get in with the vascular doc. Results are in care everywhere. Pt would like to know what you think his next steps should be. Please advise?

## 2025-06-02 ENCOUNTER — OFFICE VISIT (OUTPATIENT)
Dept: FAMILY MEDICINE CLINIC | Age: 60
End: 2025-06-02

## 2025-06-02 VITALS
HEART RATE: 85 BPM | SYSTOLIC BLOOD PRESSURE: 138 MMHG | DIASTOLIC BLOOD PRESSURE: 84 MMHG | HEIGHT: 70 IN | OXYGEN SATURATION: 98 % | BODY MASS INDEX: 36.08 KG/M2 | WEIGHT: 252 LBS

## 2025-06-02 DIAGNOSIS — I82.5Y1 CHRONIC DEEP VEIN THROMBOSIS (DVT) OF PROXIMAL VEIN OF RIGHT LOWER EXTREMITY (HCC): Primary | ICD-10-CM

## 2025-06-02 DIAGNOSIS — J06.9 URI, ACUTE: ICD-10-CM

## 2025-06-02 PROCEDURE — 99213 OFFICE O/P EST LOW 20 MIN: CPT | Performed by: PHYSICIAN ASSISTANT

## 2025-06-02 PROCEDURE — 3075F SYST BP GE 130 - 139MM HG: CPT | Performed by: PHYSICIAN ASSISTANT

## 2025-06-02 PROCEDURE — 3079F DIAST BP 80-89 MM HG: CPT | Performed by: PHYSICIAN ASSISTANT

## 2025-06-02 RX ORDER — AZITHROMYCIN 250 MG/1
TABLET, FILM COATED ORAL
Qty: 6 TABLET | Refills: 0 | Status: SHIPPED | OUTPATIENT
Start: 2025-06-02 | End: 2025-06-12

## 2025-06-02 ASSESSMENT — ENCOUNTER SYMPTOMS
WHEEZING: 0
SINUS PRESSURE: 1
COUGH: 1
SINUS PAIN: 1
SORE THROAT: 1
SHORTNESS OF BREATH: 1

## 2025-06-02 NOTE — PROGRESS NOTES
Subjective   Patient ID: Federico Ramos is a 60 y.o. male.    HPI  Started 10 days ago with head congestion, pnd, cough that is dry. Sore throat, worse with lying down due to drainage. Taking dayquil and mucinex. Mild SOB with coughing no wheezing.     Patient in ER on 5/3 and dx with provoked DVT after he was playing with his dog and stomped with quite a bit of force on the blacktop. He has been on starter pack of elquis, saw hematology at [facility] to stay. on for 3 month minimum. Swelling and pin in leg have resolved.     Review of Systems   Constitutional: Negative.    HENT:  Positive for congestion, postnasal drip, sinus pressure, sinus pain and sore throat.    Respiratory:  Positive for cough and shortness of breath. Negative for wheezing.           Objective   Physical Exam  Constitutional:       Appearance: Normal appearance.   HENT:      Right Ear: Ear canal normal.      Left Ear: Ear canal normal.      Nose: Congestion present.      Mouth/Throat:      Pharynx: Oropharynx is clear.   Cardiovascular:      Rate and Rhythm: Normal rate and regular rhythm.   Neurological:      General: No focal deficit present.      Mental Status: He is alert and oriented to person, place, and time.            Assessment /Plan:     Diagnosis Orders   1. Chronic deep vein thrombosis (DVT) of proximal vein of right lower extremity (HCC)  Continue eliquis through August      2. URI, acute  Will start zpack. Patient is to call if no improvement or signs and symptoms worsen.              YINKA Rahman